# Patient Record
Sex: FEMALE | Race: WHITE | NOT HISPANIC OR LATINO | Employment: FULL TIME | URBAN - METROPOLITAN AREA
[De-identification: names, ages, dates, MRNs, and addresses within clinical notes are randomized per-mention and may not be internally consistent; named-entity substitution may affect disease eponyms.]

---

## 2017-11-28 ENCOUNTER — HOSPITAL ENCOUNTER (OUTPATIENT)
Facility: MEDICAL CENTER | Age: 52
End: 2017-11-28
Attending: PHYSICIAN ASSISTANT
Payer: COMMERCIAL

## 2017-11-28 ENCOUNTER — OFFICE VISIT (OUTPATIENT)
Dept: URGENT CARE | Facility: PHYSICIAN GROUP | Age: 52
End: 2017-11-28
Payer: COMMERCIAL

## 2017-11-28 VITALS
SYSTOLIC BLOOD PRESSURE: 124 MMHG | HEART RATE: 72 BPM | OXYGEN SATURATION: 95 % | DIASTOLIC BLOOD PRESSURE: 78 MMHG | RESPIRATION RATE: 12 BRPM | WEIGHT: 220 LBS | TEMPERATURE: 98.7 F | BODY MASS INDEX: 38.98 KG/M2

## 2017-11-28 DIAGNOSIS — L02.91 ABSCESS: ICD-10-CM

## 2017-11-28 PROCEDURE — 87205 SMEAR GRAM STAIN: CPT

## 2017-11-28 PROCEDURE — 87070 CULTURE OTHR SPECIMN AEROBIC: CPT

## 2017-11-28 PROCEDURE — 10060 I&D ABSCESS SIMPLE/SINGLE: CPT | Performed by: PHYSICIAN ASSISTANT

## 2017-11-28 PROCEDURE — 99000 SPECIMEN HANDLING OFFICE-LAB: CPT | Performed by: PHYSICIAN ASSISTANT

## 2017-11-28 RX ORDER — DOXYCYCLINE HYCLATE 100 MG/1
100 CAPSULE ORAL 2 TIMES DAILY
Qty: 20 CAP | Refills: 0 | Status: SHIPPED | OUTPATIENT
Start: 2017-11-28 | End: 2017-12-08

## 2017-11-28 RX ORDER — CEPHALEXIN 500 MG/1
500 CAPSULE ORAL 4 TIMES DAILY
COMMUNITY

## 2017-11-28 ASSESSMENT — ENCOUNTER SYMPTOMS
PSYCHIATRIC NEGATIVE: 1
EYES NEGATIVE: 1
ROS SKIN COMMENTS: ABSCESS
CONSTITUTIONAL NEGATIVE: 1
MUSCULOSKELETAL NEGATIVE: 1
NEUROLOGICAL NEGATIVE: 1
RESPIRATORY NEGATIVE: 1
GASTROINTESTINAL NEGATIVE: 1
CARDIOVASCULAR NEGATIVE: 1

## 2017-11-28 ASSESSMENT — PAIN SCALES - GENERAL: PAINLEVEL: NO PAIN

## 2017-11-28 NOTE — PROGRESS NOTES
Subjective:      Tosha Colin is a 52 y.o. female who presents with Nodule (x 1 year starting getting bigger x3 weeks, has been on cephalexin x1week but bump is getting bigger and has puss.)            HPI  Chief Complaint   Patient presents with   • Nodule     x 1 year starting getting bigger x3 weeks, has been on cephalexin x1week but bump is getting bigger and has puss.       HPI:  Tosha Colin is a 52 y.o. female who presents with infected cyst for the last 3 weeks.  Was seen in [place], and Rx keflex BID.  No draining.  More red and pressure feeling.  Present for 1 year as a epidermoid cyst.  Distant hx of cyst in same area that self expressed.  Patient denies HA, SOB, chest pain, palpitations, fever, chills, or n/v/d.    No MRSA infection.    No contact with medical personal, no family hx of MRSA.    History reviewed. No pertinent past medical history.    History reviewed. No pertinent surgical history.    History reviewed. No pertinent family history.  No pertinent family history.    Social History     Social History   • Marital status:      Spouse name: N/A   • Number of children: N/A   • Years of education: N/A     Occupational History   • Not on file.     Social History Main Topics   • Smoking status: Never Smoker   • Smokeless tobacco: Never Used   • Alcohol use No   • Drug use: No   • Sexual activity: Not on file     Other Topics Concern   • Not on file     Social History Narrative   • No narrative on file         Current Outpatient Prescriptions:   •  cephALEXin, 500 mg, Oral, 4XDAY, 11/27/2017  •  Multiple Vitamin (DAILY VITAMINS PO), Take  by mouth., 11/28/2017  •  gabapentin, 1-3 capsules at bedtime, caution drowsy.    Allergies   Allergen Reactions   • Benadryl Allergy Anaphylaxis     tingling in legs, numbness in legs         Review of Systems   Constitutional: Negative.    HENT: Negative.    Eyes: Negative.    Respiratory: Negative.    Cardiovascular: Negative.    Gastrointestinal:  Negative.    Genitourinary: Negative.    Musculoskeletal: Negative.    Skin:        abscess   Neurological: Negative.    Endo/Heme/Allergies: Negative.    Psychiatric/Behavioral: Negative.           Objective:     /78   Pulse 72   Temp 37.1 °C (98.7 °F)   Resp 12   Wt 99.8 kg (220 lb)   SpO2 95%   Breastfeeding? No   BMI 38.98 kg/m²      Physical Exam   Pulmonary/Chest:              Nursing note and vital signs reviewed.    Constitutional:  Appropriately groomed, pleasant affect, well nourished, and in no acute distress.    HEENT:  Head: Atraumatic, normocephalic.    Eyes:  EOMs full.  Conjunctivae clear, sclera white, and medial canthus without exudate bilaterally.    Ears:  Hearing grossly intact to voice.    Neck:  FROM.  No anterior cervical chain lymphadenopathy. Thyroid nonpalpable, without masses or nodules. No supraclavicular lymphadenopathy to palpation.    Lungs:  Lungs with normal respiratory excursion and effort.      Muscle skeletal:  Gait and station wnl, non antalgic.    Derm:  Abscess present inferior to left breast.  4cm by 3cm in diameter.  Overall good turgor pressure.     Psychiatric:  Normal judgement, mood and affect.    I&D:   After discussing the procedure, consent was obtained.  Anesthesia was obtained using 5cc of 1% Lidocaine w/out epi w/ a 27g needle.  A #15 scalpel was used to create a linear  1.5 cm incision along Raul's lines. Copious Purulent material was expressed with pressure.  A culture was obtained.      All loculations were explored with a mosquito hemostat and irrigated with 10cc of sterile saline.  Approximately  3 cm of iodiform sterile gauze packing material was inserted into the wound with the wooden end of a cotton tipped applicator.  A 2 cm tail was left exiting the wound with a knot in place to identify the end.  The wound was dressed with 4x4 gauze as a secondary dressing and securred in place with paper tape.    Assessment/Plan:     1. Abscess   doxycycline (VIBRAMYCIN) 100 MG Cap    CULTURE WOUND W/ GRAM STAIN      Patient presents with abscess x 1 + year with recent worsening pain and redness.  Please see procedure note for further details.  Significant amount of purulent material was expressed.  Culture obtained.  Switched patient's keflex to doxycycline for MRSA coverage.  Advised rtc in 2-3 days for wound check and packing change.    Patient was in agreement with this treatment plan and seemed to understand without barriers. All questions were encouraged and answered.  Reviewed signs and symptoms of when to seek emergency medical care.     Please note that this dictation was created using voice recognition software.  I have made every reasonable attempt to correct obvious errors, but I expect there are errors of marguerite and possibly content that I did not discover before finalizing the note.

## 2017-11-29 LAB
GRAM STN SPEC: NORMAL
SIGNIFICANT IND 70042: NORMAL
SITE SITE: NORMAL
SOURCE SOURCE: NORMAL

## 2017-11-30 LAB
BACTERIA WND AEROBE CULT: NORMAL
GRAM STN SPEC: NORMAL
SIGNIFICANT IND 70042: NORMAL
SITE SITE: NORMAL
SOURCE SOURCE: NORMAL

## 2017-12-01 ENCOUNTER — OFFICE VISIT (OUTPATIENT)
Dept: URGENT CARE | Facility: PHYSICIAN GROUP | Age: 52
End: 2017-12-01
Payer: COMMERCIAL

## 2017-12-01 VITALS
WEIGHT: 220 LBS | DIASTOLIC BLOOD PRESSURE: 76 MMHG | HEART RATE: 64 BPM | HEIGHT: 63 IN | BODY MASS INDEX: 38.98 KG/M2 | TEMPERATURE: 98.2 F | RESPIRATION RATE: 16 BRPM | SYSTOLIC BLOOD PRESSURE: 124 MMHG | OXYGEN SATURATION: 97 %

## 2017-12-01 DIAGNOSIS — Z51.89 WOUND CHECK, ABSCESS: ICD-10-CM

## 2017-12-01 PROCEDURE — 99024 POSTOP FOLLOW-UP VISIT: CPT | Performed by: PHYSICIAN ASSISTANT

## 2017-12-01 ASSESSMENT — ENCOUNTER SYMPTOMS
FEVER: 0
DIARRHEA: 0
MUSCULOSKELETAL NEGATIVE: 1
SHORTNESS OF BREATH: 0
ABDOMINAL PAIN: 0
DIZZINESS: 0
NAUSEA: 0
CHILLS: 0
VOMITING: 0
ROS SKIN COMMENTS: POSITIVE FOR ABSCESS

## 2017-12-01 NOTE — PROGRESS NOTES
"Subjective:      Tosha Colin is a 52 y.o. female who presents with Wound Check (abscess on chest )            Wound Check   She was originally treated 3 to 5 days ago (3 days). Previous treatment included I&D of abscess and oral antibiotics. The maximum temperature noted was less than 100.4 F. There has been colored discharge from the wound. The redness has improved. The swelling has improved. The pain has improved.     Patient presents to urgent care for wound check. She had an I&D performed 3 days ago on an abscess located on her anterior chest. The packing fell out this morning. She reports improvement in the area and is tolerating oral doxycycline well.     Review of Systems   Constitutional: Negative for chills and fever.   HENT: Negative for congestion.    Respiratory: Negative for shortness of breath.    Cardiovascular: Negative for chest pain.   Gastrointestinal: Negative for abdominal pain, diarrhea, nausea and vomiting.   Genitourinary: Negative.    Musculoskeletal: Negative.    Skin:        Positive for abscess   Neurological: Negative for dizziness.        Objective:     /76   Pulse 64   Temp 36.8 °C (98.2 °F)   Resp 16   Ht 1.6 m (5' 2.99\")   Wt 99.8 kg (220 lb)   SpO2 97%   BMI 38.98 kg/m²      Physical Exam   Constitutional: She is oriented to person, place, and time. She appears well-developed and well-nourished. No distress.   HENT:   Head: Normocephalic and atraumatic.   Eyes: Pupils are equal, round, and reactive to light.   Neck: Normal range of motion.   Cardiovascular: Normal rate.    Pulmonary/Chest: Effort normal.       Well healing abscess noted on right anterior chest wall with mild amount of yellow purulent discharge noted.   Musculoskeletal: Normal range of motion.   Neurological: She is alert and oriented to person, place, and time.   Skin: Skin is warm and dry. She is not diaphoretic.   Psychiatric: She has a normal mood and affect. Her behavior is normal.   Nursing " note and vitals reviewed.         PMH:  has no past medical history on file.  MEDS:   Current Outpatient Prescriptions:   •  doxycycline (VIBRAMYCIN) 100 MG Cap, Take 1 Cap by mouth 2 times a day for 10 days. May increase risk of sunburn., Disp: 20 Cap, Rfl: 0  •  Multiple Vitamin (DAILY VITAMINS PO), Take  by mouth., Disp: , Rfl:   •  cephALEXin (KEFLEX) 500 MG Cap, Take 500 mg by mouth 4 times a day., Disp: , Rfl:   •  gabapentin (NEURONTIN) 100 MG Cap, 1-3 capsules at bedtime, caution drowsy., Disp: 30 Cap, Rfl: 0  ALLERGIES:   Allergies   Allergen Reactions   • Benadryl Allergy Anaphylaxis     tingling in legs, numbness in legs     SURGHX: History reviewed. No pertinent surgical history.  SOCHX:  reports that she has never smoked. She has never used smokeless tobacco. She reports that she does not drink alcohol or use drugs.  FH: family history is not on file.       Assessment/Plan:     1. Wound check, abscess    Wound well healing at today's visit. No repacking required today. Encouraged to continue antibiotics until finished. She will follow up with general surgery in 1-2 months for cyst excision.

## 2019-04-02 ENCOUNTER — HOSPITAL ENCOUNTER (EMERGENCY)
Dept: HOSPITAL 8 - ED | Age: 54
Discharge: HOME | End: 2019-04-02
Payer: COMMERCIAL

## 2019-04-02 VITALS — HEIGHT: 63 IN | BODY MASS INDEX: 38.67 KG/M2 | WEIGHT: 218.26 LBS

## 2019-04-02 VITALS — SYSTOLIC BLOOD PRESSURE: 105 MMHG | DIASTOLIC BLOOD PRESSURE: 47 MMHG

## 2019-04-02 DIAGNOSIS — R19.7: Primary | ICD-10-CM

## 2019-04-02 DIAGNOSIS — R10.11: ICD-10-CM

## 2019-04-02 LAB
ALBUMIN SERPL-MCNC: 3.5 G/DL (ref 3.4–5)
ALP SERPL-CCNC: 55 U/L (ref 45–117)
ALT SERPL-CCNC: 41 U/L (ref 12–78)
ANION GAP SERPL CALC-SCNC: 4 MMOL/L (ref 5–15)
BASOPHILS # BLD AUTO: 0.01 X10^3/UL (ref 0–0.1)
BASOPHILS NFR BLD AUTO: 0 % (ref 0–1)
BILIRUB SERPL-MCNC: 0.6 MG/DL (ref 0.2–1)
CALCIUM SERPL-MCNC: 8.8 MG/DL (ref 8.5–10.1)
CHLORIDE SERPL-SCNC: 106 MMOL/L (ref 98–107)
CREAT SERPL-MCNC: 0.76 MG/DL (ref 0.55–1.02)
CULTURE INDICATED?: NO
EOSINOPHIL # BLD AUTO: 0.01 X10^3/UL (ref 0–0.4)
EOSINOPHIL NFR BLD AUTO: 0 % (ref 1–7)
ERYTHROCYTE [DISTWIDTH] IN BLOOD BY AUTOMATED COUNT: 13 % (ref 9.6–15.2)
LYMPHOCYTES # BLD AUTO: 1.42 X10^3/UL (ref 1–3.4)
LYMPHOCYTES NFR BLD AUTO: 32 % (ref 22–44)
MCH RBC QN AUTO: 29.4 PG (ref 27–34.8)
MCHC RBC AUTO-ENTMCNC: 32.8 G/DL (ref 32.4–35.8)
MCV RBC AUTO: 89.6 FL (ref 80–100)
MD: NO
MICROSCOPIC: (no result)
MONOCYTES # BLD AUTO: 0.55 X10^3/UL (ref 0.2–0.8)
MONOCYTES NFR BLD AUTO: 12 % (ref 2–9)
NEUTROPHILS # BLD AUTO: 2.44 X10^3/UL (ref 1.8–6.8)
NEUTROPHILS NFR BLD AUTO: 55 % (ref 42–75)
PLATELET # BLD AUTO: 196 X10^3/UL (ref 130–400)
PMV BLD AUTO: 8.1 FL (ref 7.4–10.4)
PROT SERPL-MCNC: 7.3 G/DL (ref 6.4–8.2)
RBC # BLD AUTO: 4.39 X10^6/UL (ref 3.82–5.3)

## 2019-04-02 PROCEDURE — 83690 ASSAY OF LIPASE: CPT

## 2019-04-02 PROCEDURE — 85025 COMPLETE CBC W/AUTO DIFF WBC: CPT

## 2019-04-02 PROCEDURE — 74022 RADEX COMPL AQT ABD SERIES: CPT

## 2019-04-02 PROCEDURE — 99284 EMERGENCY DEPT VISIT MOD MDM: CPT

## 2019-04-02 PROCEDURE — 36415 COLL VENOUS BLD VENIPUNCTURE: CPT

## 2019-04-02 PROCEDURE — 80053 COMPREHEN METABOLIC PANEL: CPT

## 2019-04-02 PROCEDURE — 76700 US EXAM ABDOM COMPLETE: CPT

## 2019-04-02 PROCEDURE — 81003 URINALYSIS AUTO W/O SCOPE: CPT

## 2019-04-02 NOTE — NUR
Recieved bedside report from YOSELYN Gambino. All questions answered. NADN. Pt 
resting on gurney connected to monitors. Call light within reach. Lab at 
bedside.

## 2019-04-02 NOTE — NUR
TASK RN: [facility]  INSTRUCTED ON 
COLLECTION OF CLEAN CATCH URINE.  URINE SAMPLED COLLECTED AND SENT TO LAB.

## 2019-04-02 NOTE — NUR
Pt resting on gujosie. BIRGIT. Pt's spouse at bedside. Pt connected to continous 
pulse ox. No needs expressed. Call light within reach.